# Patient Record
Sex: FEMALE | Race: BLACK OR AFRICAN AMERICAN | Employment: FULL TIME | URBAN - METROPOLITAN AREA
[De-identification: names, ages, dates, MRNs, and addresses within clinical notes are randomized per-mention and may not be internally consistent; named-entity substitution may affect disease eponyms.]

---

## 2017-01-07 ENCOUNTER — ED HISTORICAL/CONVERTED ENCOUNTER (OUTPATIENT)
Dept: OTHER | Age: 27
End: 2017-01-07

## 2017-03-13 ENCOUNTER — ED HISTORICAL/CONVERTED ENCOUNTER (OUTPATIENT)
Dept: OTHER | Age: 27
End: 2017-03-13

## 2017-04-21 ENCOUNTER — ED HISTORICAL/CONVERTED ENCOUNTER (OUTPATIENT)
Dept: OTHER | Age: 27
End: 2017-04-21

## 2017-05-05 ENCOUNTER — ED HISTORICAL/CONVERTED ENCOUNTER (OUTPATIENT)
Dept: OTHER | Age: 27
End: 2017-05-05

## 2017-11-03 ENCOUNTER — ED HISTORICAL/CONVERTED ENCOUNTER (OUTPATIENT)
Dept: OTHER | Age: 27
End: 2017-11-03

## 2018-07-23 ENCOUNTER — ED HISTORICAL/CONVERTED ENCOUNTER (OUTPATIENT)
Dept: OTHER | Age: 28
End: 2018-07-23

## 2019-07-28 ENCOUNTER — ED HISTORICAL/CONVERTED ENCOUNTER (OUTPATIENT)
Dept: OTHER | Age: 29
End: 2019-07-28

## 2022-02-28 ENCOUNTER — HOSPITAL ENCOUNTER (EMERGENCY)
Age: 32
Discharge: HOME | End: 2022-02-28
Attending: EMERGENCY MEDICINE
Payer: COMMERCIAL

## 2022-02-28 VITALS
DIASTOLIC BLOOD PRESSURE: 77 MMHG | RESPIRATION RATE: 22 BRPM | BODY MASS INDEX: 36.7 KG/M2 | SYSTOLIC BLOOD PRESSURE: 141 MMHG | HEIGHT: 64 IN | TEMPERATURE: 98.1 F | HEART RATE: 89 BPM | WEIGHT: 215 LBS | OXYGEN SATURATION: 100 %

## 2022-02-28 DIAGNOSIS — N93.9 VAGINAL BLEEDING: Primary | ICD-10-CM

## 2022-02-28 LAB
ABO + RH BLD: NORMAL
ALBUMIN SERPL-MCNC: 3.6 G/DL (ref 3.5–5)
ALBUMIN/GLOB SERPL: 0.7 {RATIO} (ref 1.1–2.2)
ALP SERPL-CCNC: 142 U/L (ref 45–117)
ALT SERPL-CCNC: 21 U/L (ref 12–78)
ANION GAP SERPL CALC-SCNC: 12 MMOL/L (ref 5–15)
AST SERPL W P-5'-P-CCNC: 12 U/L (ref 15–37)
BASOPHILS # BLD: 0 K/UL (ref 0–0.1)
BASOPHILS NFR BLD: 0 % (ref 0–1)
BILIRUB SERPL-MCNC: 0.4 MG/DL (ref 0.2–1)
BLOOD GROUP ANTIBODIES SERPL: NEGATIVE
BUN SERPL-MCNC: 10 MG/DL (ref 6–20)
BUN/CREAT SERPL: 8 (ref 12–20)
CA-I BLD-MCNC: 9.8 MG/DL (ref 8.5–10.1)
CHLORIDE SERPL-SCNC: 99 MMOL/L (ref 97–108)
CO2 SERPL-SCNC: 25 MMOL/L (ref 21–32)
CREAT SERPL-MCNC: 1.24 MG/DL (ref 0.55–1.02)
DIFFERENTIAL METHOD BLD: ABNORMAL
EOSINOPHIL # BLD: 0.1 K/UL (ref 0–0.4)
EOSINOPHIL NFR BLD: 1 % (ref 0–7)
ERYTHROCYTE [DISTWIDTH] IN BLOOD BY AUTOMATED COUNT: 15 % (ref 11.5–14.5)
GLOBULIN SER CALC-MCNC: 5 G/DL (ref 2–4)
GLUCOSE SERPL-MCNC: 414 MG/DL (ref 65–100)
HCG SERPL-ACNC: 4.5 MIU/ML (ref 0–6)
HCT VFR BLD AUTO: 43.6 % (ref 35–47)
HGB BLD-MCNC: 13.9 G/DL (ref 11.5–16)
IMM GRANULOCYTES # BLD AUTO: 0 K/UL (ref 0–0.04)
IMM GRANULOCYTES NFR BLD AUTO: 0 % (ref 0–0.5)
LYMPHOCYTES # BLD: 2.5 K/UL (ref 0.8–3.5)
LYMPHOCYTES NFR BLD: 34 % (ref 12–49)
MCH RBC QN AUTO: 24.8 PG (ref 26–34)
MCHC RBC AUTO-ENTMCNC: 31.9 G/DL (ref 30–36.5)
MCV RBC AUTO: 77.7 FL (ref 80–99)
MONOCYTES # BLD: 0.4 K/UL (ref 0–1)
MONOCYTES NFR BLD: 5 % (ref 5–13)
NEUTS SEG # BLD: 4.3 K/UL (ref 1.8–8)
NEUTS SEG NFR BLD: 60 % (ref 32–75)
PLATELET # BLD AUTO: 118 K/UL (ref 150–400)
POTASSIUM SERPL-SCNC: 4.3 MMOL/L (ref 3.5–5.1)
PROT SERPL-MCNC: 8.6 G/DL (ref 6.4–8.2)
RBC # BLD AUTO: 5.61 M/UL (ref 3.8–5.2)
SODIUM SERPL-SCNC: 136 MMOL/L (ref 136–145)
SPECIMEN EXP DATE BLD: NORMAL
WBC # BLD AUTO: 7.3 K/UL (ref 3.6–11)

## 2022-02-28 PROCEDURE — 99284 EMERGENCY DEPT VISIT MOD MDM: CPT

## 2022-02-28 PROCEDURE — 80053 COMPREHEN METABOLIC PANEL: CPT

## 2022-02-28 PROCEDURE — 74011250636 HC RX REV CODE- 250/636: Performed by: EMERGENCY MEDICINE

## 2022-02-28 PROCEDURE — 86900 BLOOD TYPING SEROLOGIC ABO: CPT

## 2022-02-28 PROCEDURE — 84702 CHORIONIC GONADOTROPIN TEST: CPT

## 2022-02-28 PROCEDURE — 96360 HYDRATION IV INFUSION INIT: CPT

## 2022-02-28 PROCEDURE — 85025 COMPLETE CBC W/AUTO DIFF WBC: CPT

## 2022-02-28 PROCEDURE — 74011250637 HC RX REV CODE- 250/637: Performed by: EMERGENCY MEDICINE

## 2022-02-28 RX ORDER — ACETAMINOPHEN 500 MG
1000 TABLET ORAL
Status: COMPLETED | OUTPATIENT
Start: 2022-02-28 | End: 2022-02-28

## 2022-02-28 RX ADMIN — SODIUM CHLORIDE 1000 ML: 9 INJECTION, SOLUTION INTRAVENOUS at 17:06

## 2022-02-28 RX ADMIN — ACETAMINOPHEN 1000 MG: 500 TABLET ORAL at 17:19

## 2022-02-28 NOTE — DISCHARGE INSTRUCTIONS
Thank you! Thank you for allowing me to care for you in the emergency department. I sincerely hope that you are satisfied with your visit today. It is my goal to provide you with excellent care. Below you will find a list of your labs and imaging from your visit today. Should you have any questions regarding these results please do not hesitate to call the emergency department. Labs -     Recent Results (from the past 12 hour(s))   BETA HCG, QT    Collection Time: 02/28/22  5:00 PM   Result Value Ref Range    Beta HCG, QT 4.5 0 - 6 mIU/mL   CBC WITH AUTOMATED DIFF    Collection Time: 02/28/22  5:00 PM   Result Value Ref Range    WBC 7.3 3.6 - 11.0 K/uL    RBC 5.61 (H) 3.80 - 5.20 M/uL    HGB 13.9 11.5 - 16.0 g/dL    HCT 43.6 35.0 - 47.0 %    MCV 77.7 (L) 80.0 - 99.0 FL    MCH 24.8 (L) 26.0 - 34.0 PG    MCHC 31.9 30.0 - 36.5 g/dL    RDW 15.0 (H) 11.5 - 14.5 %    PLATELET 655 (L) 869 - 400 K/uL    NEUTROPHILS 60 32 - 75 %    LYMPHOCYTES 34 12 - 49 %    MONOCYTES 5 5 - 13 %    EOSINOPHILS 1 0 - 7 %    BASOPHILS 0 0 - 1 %    IMMATURE GRANULOCYTES 0 0.0 - 0.5 %    ABS. NEUTROPHILS 4.3 1.8 - 8.0 K/UL    ABS. LYMPHOCYTES 2.5 0.8 - 3.5 K/UL    ABS. MONOCYTES 0.4 0.0 - 1.0 K/UL    ABS. EOSINOPHILS 0.1 0.0 - 0.4 K/UL    ABS. BASOPHILS 0.0 0.0 - 0.1 K/UL    ABS. IMM. GRANS. 0.0 0.00 - 0.04 K/UL    DF AUTOMATED     METABOLIC PANEL, COMPREHENSIVE    Collection Time: 02/28/22  5:00 PM   Result Value Ref Range    Sodium 136 136 - 145 mmol/L    Potassium 4.3 3.5 - 5.1 mmol/L    Chloride 99 97 - 108 mmol/L    CO2 25 21 - 32 mmol/L    Anion gap 12 5 - 15 mmol/L    Glucose 414 (H) 65 - 100 mg/dL    BUN 10 6 - 20 mg/dL    Creatinine 1.24 (H) 0.55 - 1.02 mg/dL    BUN/Creatinine ratio 8 (L) 12 - 20      GFR est AA >60 >60 ml/min/1.73m2    GFR est non-AA 51 (L) >60 ml/min/1.73m2    Calcium 9.8 8.5 - 10.1 mg/dL    Bilirubin, total 0.4 0.2 - 1.0 mg/dL    AST (SGOT) 12 (L) 15 - 37 U/L    ALT (SGPT) 21 12 - 78 U/L    Alk. phosphatase 142 (H) 45 - 117 U/L    Protein, total 8.6 (H) 6.4 - 8.2 g/dL    Albumin 3.6 3.5 - 5.0 g/dL    Globulin 5.0 (H) 2.0 - 4.0 g/dL    A-G Ratio 0.7 (L) 1.1 - 2.2         Radiologic Studies -   No orders to display     CT Results  (Last 48 hours)      None          CXR Results  (Last 48 hours)      None               If you feel that you have not received excellent quality care or timely care, please ask to speak to the nurse manager. Please choose us in the future for your continued health care needs. ------------------------------------------------------------------------------------------------------------  The exam and treatment you received in the Emergency Department were for an urgent problem and are not intended as complete care. It is important that you follow-up with a doctor, nurse practitioner, or physician assistant to:  (1) confirm your diagnosis,  (2) re-evaluation of changes in your illness and treatment, and  (3) for ongoing care. If your symptoms become worse or you do not improve as expected and you are unable to reach your usual health care provider, you should return to the Emergency Department. We are available 24 hours a day. Please take your discharge instructions with you when you go to your follow-up appointment. If you have any problem arranging a follow-up appointment, contact the Emergency Department immediately. If a prescription has been provided, please have it filled as soon as possible to prevent a delay in treatment. Read the entire medication instruction sheet provided to you by the pharmacy. If you have any questions or reservations about taking the medication due to side effects or interactions with other medications, please call your primary care physician or contact the ER to speak with the charge nurse.      Make an appointment with your family doctor or the physician you were referred to for follow-up of this visit as instructed on your discharge paperwork, as this is a mandatory follow-up. Return to the ER if you are unable to be seen or if you are unable to be seen in a timely manner. If you have any problem arranging the follow-up visit, contact the Emergency Department immediately.

## 2022-02-28 NOTE — ED TRIAGE NOTES
Pt c/o vaginal bleeding and abdominal cramping. Cramping began three days ago. Bleeding began yesterday. LMP in December, no confirmed pregnancy.   Hx of miscarriage in past.

## 2022-03-02 NOTE — ED PROVIDER NOTES
EMERGENCY DEPARTMENT HISTORY AND PHYSICAL EXAM      Date: 2/28/2022  Patient Name: Maulik Reyes    History of Presenting Illness     Chief Complaint   Patient presents with    Vaginal Bleeding    Abdominal Pain       History Provided By: Patient    HPI: Maulik Reyes, 29 y.o. female with a past medical history significant for previous miscarraige presents to the ED with cc of heavy vaginal bleeding. Patient states that she started bleeding yesterday. Last period was in December, however patient states that she does have irregular periods. Patient reports that bleeding has slowed, however she is having severe cramps. Patient states that she is bleeding heavier than her usual periods. She is unsure if she is pregnant. Patient reports no dizziness, lightheadedness, nausea, vomiting. There are no other complaints, changes, or physical findings at this time. PCP: None    No current facility-administered medications on file prior to encounter. No current outpatient medications on file prior to encounter. Past History     Past Medical History:  No past medical history on file. Past Surgical History:  No past surgical history on file. Family History:  No family history on file. Social History:  Social History     Tobacco Use    Smoking status: Not on file    Smokeless tobacco: Not on file   Substance Use Topics    Alcohol use: Not on file    Drug use: Not on file       Allergies:  No Known Allergies      Review of Systems     Review of Systems   Constitutional: Negative for chills and fever. HENT: Negative for congestion and sore throat. Eyes: Negative for pain and visual disturbance. Respiratory: Negative for cough and shortness of breath. Cardiovascular: Negative for chest pain and palpitations. Gastrointestinal: Negative for constipation, diarrhea, nausea and vomiting. Genitourinary: Positive for menstrual problem, pelvic pain and vaginal bleeding.  Negative for dysuria and frequency. Musculoskeletal: Negative for arthralgias and myalgias. Skin: Negative for color change and rash. Neurological: Negative for dizziness, weakness, light-headedness and headaches. Psychiatric/Behavioral: Negative for dysphoric mood and sleep disturbance. Physical Exam     Physical Exam  Constitutional:       Appearance: Normal appearance. HENT:      Head: Normocephalic and atraumatic. Right Ear: External ear normal.      Left Ear: External ear normal.      Nose: Nose normal.      Mouth/Throat:      Mouth: Mucous membranes are moist.   Eyes:      Extraocular Movements: Extraocular movements intact. Conjunctiva/sclera: Conjunctivae normal.   Cardiovascular:      Rate and Rhythm: Normal rate and regular rhythm. Pulses: Normal pulses. Heart sounds: Normal heart sounds. Pulmonary:      Effort: Pulmonary effort is normal.      Breath sounds: Normal breath sounds. Abdominal:      General: Abdomen is flat. There is no distension. Palpations: Abdomen is soft. Tenderness: There is abdominal tenderness in the suprapubic area. Musculoskeletal:         General: Normal range of motion. Cervical back: Normal range of motion. Skin:     General: Skin is warm and dry. Capillary Refill: Capillary refill takes less than 2 seconds. Neurological:      General: No focal deficit present. Mental Status: She is alert and oriented to person, place, and time. Mental status is at baseline. Psychiatric:         Mood and Affect: Mood normal.         Behavior: Behavior normal.         Lab and Diagnostic Study Results     Labs -   No results found for this or any previous visit (from the past 12 hour(s)). Radiologic Studies -   @lastxrresult@  CT Results  (Last 48 hours)    None        CXR Results  (Last 48 hours)    None            Medical Decision Making   - I am the first provider for this patient.     - I reviewed the vital signs, available nursing notes, past medical history, past surgical history, family history and social history. - Initial assessment performed. The patients presenting problems have been discussed, and they are in agreement with the care plan formulated and outlined with them. I have encouraged them to ask questions as they arise throughout their visit. Vital Signs-Reviewed the patient's vital signs. No data found. Records Reviewed: Nursing Notes    The patient presents with vaginal bleeding with a differential diagnosis of dysmenorrhea, metrorrhagia, menometrorrhagia, miscarriage, ruptured ectopic      ED Course:          Provider Notes (Medical Decision Making):   30-year-old female with past medical history significant for previous miscarriage presents to the ED for evaluation of heavy vaginal bleeding and cramping. Patient's last cycle was December 2021. Unknown if she is currently pregnant. On physical examination, patient is initially tachycardic. She has mild suprapubic abdominal tenderness to palpation. She is mildly anxious. Blood pressure without evidence of hypotension. There is no conjunctival pallor. CBC, CMP, quantitative hCG demonstrating hemoglobin of 13.9 and hCG of 4.5. Given hemoglobin of 13.9, did not feel that patient was lost a significant amount of blood. She states that bleeding has slowed while in the ED and that her biggest complaint is painful cramping. Beta hCG of 4.5 not consistent with pregnancy. Patient given 1 L normal saline IV bolus and 1 g of p.o. Tylenol with subjective improvement of her symptoms as well as tachycardia. She is instructed to follow-up with her OB/GYN and return to the ED if she develops new or worsening symptoms. MDM       Procedures   Medical Decision Makingedical Decision Making  Performed by: Jorje Espinal DO  PROCEDURES:  Procedures       Disposition   Disposition: Condition stable and improved  DC- Adult Discharges:  All of the diagnostic tests were reviewed and questions answered. Diagnosis, care plan and treatment options were discussed. The patient understands the instructions and will follow up as directed. The patients results have been reviewed with them. They have been counseled regarding their diagnosis. The patient verbally convey understanding and agreement of the signs, symptoms, diagnosis, treatment and prognosis and additionally agrees to follow up as recommended with their PCP in 24 - 48 hours. They also agree with the care-plan and convey that all of their questions have been answered. I have also put together some discharge instructions for them that include: 1) educational information regarding their diagnosis, 2) how to care for their diagnosis at home, as well a 3) list of reasons why they would want to return to the ED prior to their follow-up appointment, should their condition change. DC-The patient was given verbal vaginal bleeding instructions    Discharged    DISCHARGE PLAN:  1. Cannot display discharge medications since this patient is not currently admitted. 2.   Follow-up Information     Follow up With Specialties Details Why Contact Info    [address] [place] [address] Emergency Medicine  As needed, If symptoms worsen [address] [place] [id number] [phone]    Your OB  Schedule an appointment as soon as possible for a visit           3. Return to ED if worse   4. There are no discharge medications for this patient. Diagnosis     Clinical Impression:   1. Vaginal bleeding        Attestations:    Tl Lagunas, DO    Please note that this dictation was completed with [employer], the computer voice recognition software. Quite often unanticipated grammatical, syntax, homophones, and other interpretive errors are inadvertently transcribed by the computer software. Please disregard these errors. Please excuse any errors that have escaped final proofreading. Thank you.

## 2022-11-24 ENCOUNTER — HOSPITAL ENCOUNTER (EMERGENCY)
Age: 32
Discharge: HOME | End: 2022-11-24
Attending: EMERGENCY MEDICINE
Payer: COMMERCIAL

## 2022-11-24 ENCOUNTER — APPOINTMENT (OUTPATIENT)
Dept: GENERAL RADIOLOGY | Age: 32
End: 2022-11-24
Attending: EMERGENCY MEDICINE
Payer: COMMERCIAL

## 2022-11-24 VITALS
DIASTOLIC BLOOD PRESSURE: 89 MMHG | WEIGHT: 275 LBS | OXYGEN SATURATION: 99 % | SYSTOLIC BLOOD PRESSURE: 127 MMHG | BODY MASS INDEX: 46.95 KG/M2 | RESPIRATION RATE: 16 BRPM | HEIGHT: 64 IN | HEART RATE: 91 BPM | TEMPERATURE: 98.1 F

## 2022-11-24 DIAGNOSIS — E11.9 DIABETES MELLITUS, NEW ONSET (HCC): Primary | ICD-10-CM

## 2022-11-24 LAB
ALBUMIN SERPL-MCNC: 3.5 G/DL (ref 3.5–5)
ALBUMIN/GLOB SERPL: 0.7 {RATIO} (ref 1.1–2.2)
ALP SERPL-CCNC: 122 U/L (ref 45–117)
ALT SERPL-CCNC: 39 U/L (ref 12–78)
ANION GAP SERPL CALC-SCNC: 5 MMOL/L (ref 5–15)
AST SERPL W P-5'-P-CCNC: 30 U/L (ref 15–37)
B-OH-BUTYR SERPL-SCNC: 0.65 MMOL/L
BASE EXCESS BLDV CALC-SCNC: 0.3 MMOL/L (ref 0–3)
BASOPHILS # BLD: 0 K/UL (ref 0–0.1)
BASOPHILS NFR BLD: 0 % (ref 0–1)
BDY SITE: ABNORMAL
BILIRUB SERPL-MCNC: 0.5 MG/DL (ref 0.2–1)
BNP SERPL-MCNC: 20 PG/ML
BODY TEMPERATURE: 98.3
BUN SERPL-MCNC: 9 MG/DL (ref 6–20)
BUN/CREAT SERPL: 9 (ref 12–20)
CA-I BLD-MCNC: 9.6 MG/DL (ref 8.5–10.1)
CHLORIDE SERPL-SCNC: 99 MMOL/L (ref 97–108)
CO2 SERPL-SCNC: 27 MMOL/L (ref 21–32)
CREAT SERPL-MCNC: 1 MG/DL (ref 0.55–1.02)
DIFFERENTIAL METHOD BLD: ABNORMAL
EOSINOPHIL # BLD: 0 K/UL (ref 0–0.4)
EOSINOPHIL NFR BLD: 1 % (ref 0–7)
ERYTHROCYTE [DISTWIDTH] IN BLOOD BY AUTOMATED COUNT: 15.2 % (ref 11.5–14.5)
GLOBULIN SER CALC-MCNC: 4.9 G/DL (ref 2–4)
GLUCOSE BLD STRIP.AUTO-MCNC: 297 MG/DL (ref 65–100)
GLUCOSE SERPL-MCNC: 340 MG/DL (ref 65–100)
HCG SERPL QL: NEGATIVE
HCO3 BLDV-SCNC: 24 MMOL/L (ref 24–25)
HCT VFR BLD AUTO: 40.7 % (ref 35–47)
HGB BLD-MCNC: 12.6 G/DL (ref 11.5–16)
IMM GRANULOCYTES # BLD AUTO: 0 K/UL (ref 0–0.04)
IMM GRANULOCYTES NFR BLD AUTO: 0 % (ref 0–0.5)
LYMPHOCYTES # BLD: 2 K/UL (ref 0.8–3.5)
LYMPHOCYTES NFR BLD: 39 % (ref 12–49)
MAGNESIUM SERPL-MCNC: 1.9 MG/DL (ref 1.6–2.4)
MCH RBC QN AUTO: 23.6 PG (ref 26–34)
MCHC RBC AUTO-ENTMCNC: 31 G/DL (ref 30–36.5)
MCV RBC AUTO: 76.1 FL (ref 80–99)
MONOCYTES # BLD: 0.3 K/UL (ref 0–1)
MONOCYTES NFR BLD: 7 % (ref 5–13)
NEUTS SEG # BLD: 2.7 K/UL (ref 1.8–8)
NEUTS SEG NFR BLD: 53 % (ref 32–75)
NRBC # BLD: 0 K/UL (ref 0–0.01)
NRBC BLD-RTO: 0 PER 100 WBC
PCO2 BLDV: 34.3 MMHG (ref 41–51)
PERFORMED BY, TECHID: ABNORMAL
PERFORMED BY, TECHID: ABNORMAL
PH BLDV: 7.46 [PH] (ref 7.32–7.42)
PLATELET # BLD AUTO: 132 K/UL (ref 150–400)
PO2 BLDV: 141 MMHG (ref 25–40)
POTASSIUM SERPL-SCNC: 5 MMOL/L (ref 3.5–5.1)
PROT SERPL-MCNC: 8.4 G/DL (ref 6.4–8.2)
RBC # BLD AUTO: 5.35 M/UL (ref 3.8–5.2)
SAO2% DEVICE SAO2% SENSOR NAME: ABNORMAL
SODIUM SERPL-SCNC: 131 MMOL/L (ref 136–145)
SPECIMEN SITE: ABNORMAL
TROPONIN-HIGH SENSITIVITY: 9 NG/L (ref 0–51)
TSH SERPL DL<=0.05 MIU/L-ACNC: 2.19 UIU/ML (ref 0.36–3.74)
WBC # BLD AUTO: 5.1 K/UL (ref 3.6–11)

## 2022-11-24 PROCEDURE — 85025 COMPLETE CBC W/AUTO DIFF WBC: CPT

## 2022-11-24 PROCEDURE — 82962 GLUCOSE BLOOD TEST: CPT

## 2022-11-24 PROCEDURE — 82803 BLOOD GASES ANY COMBINATION: CPT

## 2022-11-24 PROCEDURE — 74011250636 HC RX REV CODE- 250/636: Performed by: EMERGENCY MEDICINE

## 2022-11-24 PROCEDURE — 96374 THER/PROPH/DIAG INJ IV PUSH: CPT

## 2022-11-24 PROCEDURE — 84443 ASSAY THYROID STIM HORMONE: CPT

## 2022-11-24 PROCEDURE — 84484 ASSAY OF TROPONIN QUANT: CPT

## 2022-11-24 PROCEDURE — 82010 KETONE BODYS QUAN: CPT

## 2022-11-24 PROCEDURE — 74011000250 HC RX REV CODE- 250: Performed by: EMERGENCY MEDICINE

## 2022-11-24 PROCEDURE — 96361 HYDRATE IV INFUSION ADD-ON: CPT

## 2022-11-24 PROCEDURE — 84703 CHORIONIC GONADOTROPIN ASSAY: CPT

## 2022-11-24 PROCEDURE — 71045 X-RAY EXAM CHEST 1 VIEW: CPT

## 2022-11-24 PROCEDURE — 83880 ASSAY OF NATRIURETIC PEPTIDE: CPT

## 2022-11-24 PROCEDURE — 99285 EMERGENCY DEPT VISIT HI MDM: CPT

## 2022-11-24 PROCEDURE — 36415 COLL VENOUS BLD VENIPUNCTURE: CPT

## 2022-11-24 PROCEDURE — 80053 COMPREHEN METABOLIC PANEL: CPT

## 2022-11-24 PROCEDURE — 83735 ASSAY OF MAGNESIUM: CPT

## 2022-11-24 PROCEDURE — 93005 ELECTROCARDIOGRAM TRACING: CPT

## 2022-11-24 RX ORDER — METFORMIN HYDROCHLORIDE 500 MG/1
500 TABLET ORAL 2 TIMES DAILY WITH MEALS
Qty: 60 TABLET | Refills: 0 | Status: SHIPPED | OUTPATIENT
Start: 2022-11-24

## 2022-11-24 RX ORDER — METOPROLOL TARTRATE 5 MG/5ML
5 INJECTION INTRAVENOUS
Status: COMPLETED | OUTPATIENT
Start: 2022-11-24 | End: 2022-11-24

## 2022-11-24 RX ADMIN — SODIUM CHLORIDE 1000 ML: 9 INJECTION, SOLUTION INTRAVENOUS at 07:29

## 2022-11-24 RX ADMIN — SODIUM CHLORIDE 1000 ML: 9 INJECTION, SOLUTION INTRAVENOUS at 09:12

## 2022-11-24 RX ADMIN — METOPROLOL TARTRATE 5 MG: 5 INJECTION, SOLUTION INTRAVENOUS at 10:51

## 2022-11-24 NOTE — Clinical Note
[facility] EMERGENCY DEPT  [address] [place] [id number]  [phone]    Work/School Note    Date: 11/24/2022    To Whom It May concern:    Cristina Negron was seen and treated today in the emergency room by the following provider(s):  Attending Provider: Rob Javed MD.      Cristina Negron is excused from work/school on 11/24/22 and 11/25/22. She is medically clear to return to work/school on 11/26/2022.        Sincerely,          Hanna Long MD

## 2022-11-24 NOTE — Clinical Note
[facility] EMERGENCY DEPT  [address] [place] [id number]  [phone]    Work/School Note    Date: 11/24/2022    To Whom It May concern:      Clifford Toledo was seen and treated today in the emergency room by the following provider(s):  Attending Provider: Francisco Javier Ulrich MD.      Clifford Toledo is excused from work/school on 11/24/22. She is clear to return to work/school on 11/25/22.         Sincerely,          Joaquina Joe MD

## 2022-11-24 NOTE — DISCHARGE INSTRUCTIONS
Thank you! Thank you for allowing me to care for you in the emergency department. It is my goal to provide you with excellent care. If you have not received excellent quality care, please ask to speak to the nurse manager. Please fill out the survey that will come to you by mail or email since we listen to your feedback! Below you will find a list of your tests from today's visit. Should you have any questions, please do not hesitate to call the emergency department. Labs  Recent Results (from the past 12 hour(s))   CBC WITH AUTOMATED DIFF    Collection Time: 11/24/22  7:28 AM   Result Value Ref Range    WBC 5.1 3.6 - 11.0 K/uL    RBC 5.35 (H) 3.80 - 5.20 M/uL    HGB 12.6 11.5 - 16.0 g/dL    HCT 40.7 35.0 - 47.0 %    MCV 76.1 (L) 80.0 - 99.0 FL    MCH 23.6 (L) 26.0 - 34.0 PG    MCHC 31.0 30.0 - 36.5 g/dL    RDW 15.2 (H) 11.5 - 14.5 %    PLATELET 742 (L) 112 - 400 K/uL    NRBC 0.0 0.0  WBC    ABSOLUTE NRBC 0.00 0.00 - 0.01 K/uL    NEUTROPHILS 53 32 - 75 %    LYMPHOCYTES 39 12 - 49 %    MONOCYTES 7 5 - 13 %    EOSINOPHILS 1 0 - 7 %    BASOPHILS 0 0 - 1 %    IMMATURE GRANULOCYTES 0 0 - 0.5 %    ABS. NEUTROPHILS 2.7 1.8 - 8.0 K/UL    ABS. LYMPHOCYTES 2.0 0.8 - 3.5 K/UL    ABS. MONOCYTES 0.3 0.0 - 1.0 K/UL    ABS. EOSINOPHILS 0.0 0.0 - 0.4 K/UL    ABS. BASOPHILS 0.0 0.0 - 0.1 K/UL    ABS. IMM. GRANS. 0.0 0.00 - 0.04 K/UL    DF AUTOMATED     METABOLIC PANEL, COMPREHENSIVE    Collection Time: 11/24/22  7:28 AM   Result Value Ref Range    Sodium 131 (L) 136 - 145 mmol/L    Potassium 5.0 3.5 - 5.1 mmol/L    Chloride 99 97 - 108 mmol/L    CO2 27 21 - 32 mmol/L    Anion gap 5 5 - 15 mmol/L    Glucose 340 (H) 65 - 100 mg/dL    BUN 9 6 - 20 mg/dL    Creatinine 1.00 0.55 - 1.02 mg/dL    BUN/Creatinine ratio 9 (L) 12 - 20      eGFR >60 >60 ml/min/1.73m2    Calcium 9.6 8.5 - 10.1 mg/dL    Bilirubin, total 0.5 0.2 - 1.0 mg/dL    AST (SGOT) 30 15 - 37 U/L    ALT (SGPT) 39 12 - 78 U/L    Alk.  phosphatase 122 (H) 45 - 117 U/L    Protein, total 8.4 (H) 6.4 - 8.2 g/dL    Albumin 3.5 3.5 - 5.0 g/dL    Globulin 4.9 (H) 2.0 - 4.0 g/dL    A-G Ratio 0.7 (L) 1.1 - 2.2     HCG QL SERUM    Collection Time: 11/24/22  7:28 AM   Result Value Ref Range    HCG, Ql. Negative Negative     TSH 3RD GENERATION    Collection Time: 11/24/22  7:28 AM   Result Value Ref Range    TSH 2.19 0.36 - 3.74 uIU/mL   NT-PRO BNP    Collection Time: 11/24/22  7:28 AM   Result Value Ref Range    NT pro-BNP 20 <125 pg/mL   TROPONIN-HIGH SENSITIVITY    Collection Time: 11/24/22  7:28 AM   Result Value Ref Range    Troponin-High Sensitivity 9 0 - 51 ng/L   MAGNESIUM    Collection Time: 11/24/22  7:28 AM   Result Value Ref Range    Magnesium 1.9 1.6 - 2.4 mg/dL   BLOOD GAS, VENOUS    Collection Time: 11/24/22  8:04 AM   Result Value Ref Range    VENOUS PH 7.457 (H) 7.32 - 7.42      VENOUS PCO2 34.3 (L) 41 - 51 mmHg    VENOUS PO2 141 (H) 25 - 40 mmHg    VENOUS BICARBONATE 24 24 - 25 mmol/L    VENOUS BASE EXCESS 0.3 0 - 3 mmol/L    O2 METHOD Room air      Sample source Venous      SITE OTHER      Performed by Ryan Kapadia     TEMPERATURE 98.3     GLUCOSE, POC    Collection Time: 11/24/22 10:50 AM   Result Value Ref Range    Glucose (POC) 297 (H) 65 - 100 mg/dL    Performed by Jigar Kulkarni        Radiologic Studies  XR CHEST PORT   Final Result   Clear lungs. CT Results  (Last 48 hours)      None          CXR Results  (Last 48 hours)                 11/24/22 0915  XR CHEST PORT Final result    Impression:  Clear lungs. Narrative:  PORTABLE CHEST RADIOGRAPH/S: 11/24/2022 9:15 AM       INDICATION: Palpitations. COMPARISON: 7/23/2018, 5/5/2017, 3/13/2017. TECHNIQUE: Portable frontal upright radiograph/s of the chest.       FINDINGS:    The radiograph is slightly underpenetrated secondary to patient body habitus and   portable technique. The lungs are clear. The central airways are patent. No   pneumothorax or pleural effusion. ------------------------------------------------------------------------------------------------------------  The exam and treatment you received in the Emergency Department were for an urgent problem and are not intended as complete care. It is important that you follow-up with a doctor, nurse practitioner, or physician assistant to:  (1) confirm your diagnosis,  (2) re-evaluation of changes in your illness and treatment, and  (3) for ongoing care. Please take your discharge instructions with you when you go to your follow-up appointment. If you have any problem arranging a follow-up appointment, contact the Emergency Department. If your symptoms become worse or you do not improve as expected and you are unable to reach your health care provider, please return to the Emergency Department. We are available 24 hours a day. If a prescription has been provided, please have it filled as soon as possible to prevent a delay in treatment. If you have any questions or reservations about taking the medication due to side effects or interactions with other medications, please call your primary care provider or contact the ER.

## 2022-11-24 NOTE — ED PROVIDER NOTES
EMERGENCY DEPARTMENT HISTORY AND PHYSICAL EXAM      Date: 11/24/2022  Patient Name: Andreia Regalado    History of Presenting Illness     Chief Complaint   Patient presents with    Palpitations       History Provided By: Patient and EMS    HPI: Andreia Regalado, 29 y.o. female with a past medical history significant No significant past medical history presents to the ED with chief complaint of Palpitations  . 46-year-old female with a history of anxiety. No history of diabetes. Presents via EMS complaining of palpitations at home. Glucose was found to be 431. Family history of diabetes. She could feel her heart beating. Not irregularly. Not fast.  No pain. No shortness of breath. It started after she felt lightheaded and dizzy. Had an episode of nausea no vomiting. The symptoms have resolved upon arrival.  She says it might of been a panic attack. Does not take medicine on a regular for that. Presently feels much better. There are no other complaints, changes, or physical findings at this time. PCP: None    Current Facility-Administered Medications   Medication Dose Route Frequency Provider Last Rate Last Admin    sodium chloride 0.9 % bolus infusion 1,000 mL  1,000 mL IntraVENous ONCE Christiane Jeter MD         Current Outpatient Medications   Medication Sig Dispense Refill    metFORMIN (GLUCOPHAGE) 500 mg tablet Take 1 Tablet by mouth two (2) times daily (with meals). 60 Tablet 0       Past History     Past Medical History:  Past Medical History:   Diagnosis Date    Anxiety        Past Surgical History: denies    Family History: DM    Social History: denies       Allergies:  No Known Allergies      Review of Systems   Review of Systems   Constitutional: Negative. Negative for chills, fatigue and fever. HENT: Negative. Negative for congestion, nosebleeds and sore throat. Eyes: Negative. Negative for pain, discharge and visual disturbance. Respiratory: Negative. Negative for cough, chest tightness and shortness of breath. Cardiovascular:  Positive for palpitations. Negative for chest pain and leg swelling. Gastrointestinal:  Positive for nausea. Negative for abdominal pain, blood in stool, constipation, diarrhea and vomiting. Endocrine: Negative. Genitourinary: Negative. Negative for difficulty urinating, dysuria, pelvic pain and vaginal bleeding. Musculoskeletal: Negative. Negative for arthralgias, back pain and myalgias. Skin: Negative. Negative for rash and wound. Allergic/Immunologic: Negative. Neurological: Negative. Negative for dizziness, syncope, weakness, numbness and headaches. Hematological: Negative. Psychiatric/Behavioral: Negative. Negative for agitation, confusion and suicidal ideas. All other systems reviewed and are negative. Physical Exam   Physical Exam  Vitals and nursing note reviewed. Exam conducted with a chaperone present. Constitutional:       Appearance: Normal appearance. She is normal weight. HENT:      Head: Normocephalic and atraumatic. Nose: Nose normal.      Mouth/Throat:      Mouth: Mucous membranes are moist.      Pharynx: Oropharynx is clear. Eyes:      Extraocular Movements: Extraocular movements intact. Conjunctiva/sclera: Conjunctivae normal.      Pupils: Pupils are equal, round, and reactive to light. Cardiovascular:      Rate and Rhythm: Normal rate and regular rhythm. Pulses: Normal pulses. Heart sounds: Normal heart sounds. Pulmonary:      Effort: Pulmonary effort is normal. No respiratory distress. Breath sounds: Normal breath sounds. Abdominal:      General: Abdomen is flat. Bowel sounds are normal. There is no distension. Palpations: Abdomen is soft. Tenderness: There is no abdominal tenderness. There is no guarding. Musculoskeletal:         General: No swelling, tenderness, deformity or signs of injury. Normal range of motion.       Cervical back: Normal range of motion and neck supple. Right lower leg: No edema. Left lower leg: No edema. Skin:     General: Skin is warm and dry. Capillary Refill: Capillary refill takes less than 2 seconds. Findings: No lesion or rash. Neurological:      General: No focal deficit present. Mental Status: She is alert and oriented to person, place, and time. Mental status is at baseline. Cranial Nerves: No cranial nerve deficit. Psychiatric:         Mood and Affect: Mood normal.         Behavior: Behavior normal.         Thought Content: Thought content normal.         Judgment: Judgment normal.       Diagnostic Study Results     Labs -     Recent Results (from the past 12 hour(s))   CBC WITH AUTOMATED DIFF    Collection Time: 11/24/22  7:28 AM   Result Value Ref Range    WBC 5.1 3.6 - 11.0 K/uL    RBC 5.35 (H) 3.80 - 5.20 M/uL    HGB 12.6 11.5 - 16.0 g/dL    HCT 40.7 35.0 - 47.0 %    MCV 76.1 (L) 80.0 - 99.0 FL    MCH 23.6 (L) 26.0 - 34.0 PG    MCHC 31.0 30.0 - 36.5 g/dL    RDW 15.2 (H) 11.5 - 14.5 %    PLATELET 459 (L) 620 - 400 K/uL    NRBC 0.0 0.0  WBC    ABSOLUTE NRBC 0.00 0.00 - 0.01 K/uL    NEUTROPHILS 53 32 - 75 %    LYMPHOCYTES 39 12 - 49 %    MONOCYTES 7 5 - 13 %    EOSINOPHILS 1 0 - 7 %    BASOPHILS 0 0 - 1 %    IMMATURE GRANULOCYTES 0 0 - 0.5 %    ABS. NEUTROPHILS 2.7 1.8 - 8.0 K/UL    ABS. LYMPHOCYTES 2.0 0.8 - 3.5 K/UL    ABS. MONOCYTES 0.3 0.0 - 1.0 K/UL    ABS. EOSINOPHILS 0.0 0.0 - 0.4 K/UL    ABS. BASOPHILS 0.0 0.0 - 0.1 K/UL    ABS. IMM.  GRANS. 0.0 0.00 - 0.04 K/UL    DF AUTOMATED     METABOLIC PANEL, COMPREHENSIVE    Collection Time: 11/24/22  7:28 AM   Result Value Ref Range    Sodium 131 (L) 136 - 145 mmol/L    Potassium 5.0 3.5 - 5.1 mmol/L    Chloride 99 97 - 108 mmol/L    CO2 27 21 - 32 mmol/L    Anion gap 5 5 - 15 mmol/L    Glucose 340 (H) 65 - 100 mg/dL    BUN 9 6 - 20 mg/dL    Creatinine 1.00 0.55 - 1.02 mg/dL    BUN/Creatinine ratio 9 (L) 12 - 20      eGFR >60 >60 ml/min/1.73m2    Calcium 9.6 8.5 - 10.1 mg/dL    Bilirubin, total 0.5 0.2 - 1.0 mg/dL    AST (SGOT) 30 15 - 37 U/L    ALT (SGPT) 39 12 - 78 U/L    Alk. phosphatase 122 (H) 45 - 117 U/L    Protein, total 8.4 (H) 6.4 - 8.2 g/dL    Albumin 3.5 3.5 - 5.0 g/dL    Globulin 4.9 (H) 2.0 - 4.0 g/dL    A-G Ratio 0.7 (L) 1.1 - 2.2     HCG QL SERUM    Collection Time: 11/24/22  7:28 AM   Result Value Ref Range    HCG, Ql. Negative Negative     TSH 3RD GENERATION    Collection Time: 11/24/22  7:28 AM   Result Value Ref Range    TSH 2.19 0.36 - 3.74 uIU/mL   NT-PRO BNP    Collection Time: 11/24/22  7:28 AM   Result Value Ref Range    NT pro-BNP 20 <125 pg/mL   TROPONIN-HIGH SENSITIVITY    Collection Time: 11/24/22  7:28 AM   Result Value Ref Range    Troponin-High Sensitivity 9 0 - 51 ng/L   MAGNESIUM    Collection Time: 11/24/22  7:28 AM   Result Value Ref Range    Magnesium 1.9 1.6 - 2.4 mg/dL   BLOOD GAS, VENOUS    Collection Time: 11/24/22  8:04 AM   Result Value Ref Range    VENOUS PH 7.457 (H) 7.32 - 7.42      VENOUS PCO2 34.3 (L) 41 - 51 mmHg    VENOUS PO2 141 (H) 25 - 40 mmHg    VENOUS BICARBONATE 24 24 - 25 mmol/L    VENOUS BASE EXCESS 0.3 0 - 3 mmol/L    O2 METHOD Room air      Sample source Venous      SITE OTHER      Performed by Kong Haq     TEMPERATURE 98.3     GLUCOSE, POC    Collection Time: 11/24/22 10:50 AM   Result Value Ref Range    Glucose (POC) 297 (H) 65 - 100 mg/dL    Performed by [facility]          Radiologic Studies -   XR CHEST PORT   Final Result   Clear lungs. CT Results  (Last 48 hours)      None          CXR Results  (Last 48 hours)                 11/24/22 0915  XR CHEST PORT Final result    Impression:  Clear lungs. Narrative:  PORTABLE CHEST RADIOGRAPH/S: 11/24/2022 9:15 AM       INDICATION: Palpitations. COMPARISON: 7/23/2018, 5/5/2017, 3/13/2017.        TECHNIQUE: Portable frontal upright radiograph/s of the chest.       FINDINGS:    The radiograph is slightly underpenetrated secondary to patient body habitus and   portable technique. The lungs are clear. The central airways are patent. No   pneumothorax or pleural effusion. Medical Decision Making and ED Course   I am the first provider for this patient. I reviewed the vital signs, available nursing notes, past medical history, past surgical history, family history and social history. Vital Signs-Reviewed the patient's vital signs. Patient Vitals for the past 12 hrs:   Temp Pulse Resp BP SpO2   11/24/22 1211 -- 91 -- 127/89 --   11/24/22 1208 98.1 °F (36.7 °C) 89 16 (!) 137/99 99 %   11/24/22 1051 -- (!) 109 -- (!) 143/109 --   11/24/22 1041 98 °F (36.7 °C) (!) 102 18 (!) 140/102 97 %   11/24/22 0905 98.2 °F (36.8 °C) (!) 128 16 (!) 148/110 99 %   11/24/22 0904 -- (!) 130 15 -- 100 %   11/24/22 0841 -- (!) 112 18 -- 98 %   11/24/22 0744 -- (!) 124 17 (!) 157/115 98 %   11/24/22 0714 -- (!) 125 17 (!) 140/108 99 %   11/24/22 0709 98.3 °F (36.8 °C) (!) 122 20 (!) 146/113 97 %       EKG interpretation:   EKG at 711. Sinus tachycardia rate of 127. No ST changes. No T wave inversions. Normal intervals. Reason rule out dysrhythmia. Interpreted by ER physician. Records Reviewed: Previous Hospital chart. EMS run report      ED Course:   Initial assessment performed. The patients presenting problems have been discussed, and they are in agreement with the care plan formulated and outlined with them. I have encouraged them to ask questions as they arise throughout their visit. Orders Placed This Encounter    XR CHEST PORT     Standing Status:   Standing     Number of Occurrences:   1     Order Specific Question:   Reason for Exam     Answer:   palpitations     Order Specific Question:   Is Patient Pregnant?      Answer:   Unknown    CBC WITH AUTOMATED DIFF     Standing Status:   Standing     Number of Occurrences:   1    COMPREHENSIVE METABOLIC PANEL     Standing Status:   Standing Number of Occurrences:   1    HCG QL SERUM     Standing Status:   Standing     Number of Occurrences:   1    TSH 3RD GENERATION     Standing Status:   Standing     Number of Occurrences:   1    BETA-HYDROXYBUTYRATE     Standing Status:   Standing     Number of Occurrences:   1    VENOUS BLOOD GAS     Standing Status:   Standing     Number of Occurrences:   1    URINALYSIS W/ RFLX MICROSCOPIC     Standing Status:   Standing     Number of Occurrences:   1    PRO-BNP     Standing Status:   Standing     Number of Occurrences:   1    TROPONIN-HIGH SENSITIVITY     Standing Status:   Standing     Number of Occurrences:   1    MAGNESIUM     Standing Status:   Standing     Number of Occurrences:   1    BLOOD GAS, VENOUS     Standing Status:   Standing     Number of Occurrences:   1    VITAL SIGNS     Standing Status:   Standing     Number of Occurrences:   1    POC GLUCOSE     Standing Status:   Standing     Number of Occurrences:   1    GLUCOSE, POC     Standing Status:   Standing     Number of Occurrences:   1    EKG, 12 LEAD, INITIAL     Standing Status:   Standing     Number of Occurrences:   1     Order Specific Question:   Reason for Exam:     Answer:   palpitations    SALINE LOCK IV ONE TIME STAT     Standing Status:   Standing     Number of Occurrences:   1    sodium chloride 0.9 % bolus infusion 1,000 mL    sodium chloride 0.9 % bolus infusion 1,000 mL    metoprolol (LOPRESSOR) injection 5 mg    sodium chloride 0.9 % bolus infusion 1,000 mL    metFORMIN (GLUCOPHAGE) 500 mg tablet     Sig: Take 1 Tablet by mouth two (2) times daily (with meals). Dispense:  60 Tablet     Refill:  0                 Provider Notes (Medical Decision Making):   82-year-old female presents with hyperglycemia no history of diabetes and palpitations. And to evaluate for new onset diabetes versus DKA versus thyroid disease versus pregnancy versus dysrhythmia. Vitals improved after IV fluids.   Elevated glucose without evidence of DKA.    Discharged home with metformin. Consults              Discharged    Procedures               Disposition       Emergency Department Disposition:  Discharged      DISCHARGE PLAN:    Patient is discharged home. Discharge instructions provided. Patient is stable and improved at time of disposition. Vitals are stable. 1. Cannot display discharge medications since this patient is not currently admitted. 2.   Follow-up Information       Follow up With Specialties Details Why Contact Info    Gagandeep Sol MD Internal Medicine Physician Schedule an appointment as soon as possible for a visit    [place]  [phone]            3. Return to ED if worse     Pt voiced they understand they plan and do not have questions at this time      Diagnosis     Clinical Impression:   1. Diabetes mellitus, new onset ([facility] [address]        Attestations:    Christianne Del Angel MD    Please note that this dictation was completed with Nimia, the computer voice recognition software. Quite often unanticipated grammatical, syntax, homophones, and other interpretive errors are inadvertently transcribed by the computer software. Please disregard these errors. Please excuse any errors that have escaped final proofreading. Thank you.

## 2022-11-24 NOTE — ED TRIAGE NOTES
Pt. Arrives via EMS from home with complaints of palpitations. Per EMS, pt. Blood glucose was 431. No pt. Hx of diabetes, but pt. Has a family hx. Pt. Reports nausea that has now resolved.

## 2022-11-25 LAB
ATRIAL RATE: 127 BPM
CALCULATED P AXIS, ECG09: 38 DEGREES
CALCULATED R AXIS, ECG10: 10 DEGREES
CALCULATED T AXIS, ECG11: 23 DEGREES
DIAGNOSIS, 93000: NORMAL
P-R INTERVAL, ECG05: 152 MS
Q-T INTERVAL, ECG07: 314 MS
QRS DURATION, ECG06: 70 MS
QTC CALCULATION (BEZET), ECG08: 456 MS
VENTRICULAR RATE, ECG03: 127 BPM

## 2023-03-25 ENCOUNTER — HOSPITAL ENCOUNTER (EMERGENCY)
Age: 33
Discharge: HOME | End: 2023-03-25
Attending: EMERGENCY MEDICINE
Payer: COMMERCIAL

## 2023-03-25 VITALS
RESPIRATION RATE: 19 BRPM | OXYGEN SATURATION: 100 % | TEMPERATURE: 98.6 F | HEIGHT: 64 IN | BODY MASS INDEX: 42.68 KG/M2 | WEIGHT: 250 LBS | DIASTOLIC BLOOD PRESSURE: 89 MMHG | HEART RATE: 115 BPM | SYSTOLIC BLOOD PRESSURE: 140 MMHG

## 2023-03-25 DIAGNOSIS — L02.91 ABSCESS: Primary | ICD-10-CM

## 2023-03-25 PROCEDURE — 99283 EMERGENCY DEPT VISIT LOW MDM: CPT

## 2023-03-25 RX ORDER — IBUPROFEN 600 MG/1
600 TABLET ORAL
Qty: 30 TABLET | Refills: 0 | Status: SHIPPED | OUTPATIENT
Start: 2023-03-25

## 2023-03-25 RX ORDER — SULFAMETHOXAZOLE AND TRIMETHOPRIM 800; 160 MG/1; MG/1
2 TABLET ORAL 2 TIMES DAILY
Qty: 28 TABLET | Refills: 0 | Status: SHIPPED | OUTPATIENT
Start: 2023-03-25 | End: 2023-04-01

## 2023-03-25 RX ORDER — OXYCODONE AND ACETAMINOPHEN 5; 325 MG/1; MG/1
1 TABLET ORAL
Qty: 5 TABLET | Refills: 0 | Status: SHIPPED | OUTPATIENT
Start: 2023-03-25 | End: 2023-03-28

## 2023-03-25 NOTE — Clinical Note
[address] [facility] [address]  [phone]    Work/School Note    Date: 3/25/2023    To Whom It May concern:    Bernardo Parisi was seen and treated today in the emergency room by the following provider(s):  Attending Provider: Lisa Ascencio MD  Nurse Practitioner: Dedra Wild NP. Bernardo Parisi is excused from work/school on 3/25/2023 through 3/27/2023. She is medically clear to return to work/school on 3/28/2023.          Sincerely,          Zahraa Rodriguez NP

## 2023-03-25 NOTE — ED PROVIDER NOTES
[facility] EMERGENCY DEPARTMENT  EMERGENCY DEPARTMENT HISTORY AND PHYSICAL EXAM      Date: 3/25/2023  Patient Name: Consuelo Loera  MRN: [id number]  [place]: 1990  Date of evaluation: 3/25/2023  Provider: Michele Yeung NP   Note Started: 6:01 PM 3/25/23    HISTORY OF PRESENT ILLNESS     Chief Complaint   Patient presents with    Skin Problem       History Provided By: Patient    HPI: Consuelo Loera is a 34 y.o. female with past medical history of anxiety, abscess, MRSA, presents ambulatory into the emergency department with complaints of abscess to right axilla for several days. Had a telehealth appointment 2 days ago and was prescribed doxycycline. Patient has been applying warm compresses, but the abscess never began spontaneously draining. Reports history of abscesses in the past usually in the groin, but those never required I&D because they started draining on their own. Denies fever/chills. Upon arrival to the ED pt is alert and oriented x 3, well-appearing, and interacting appropriately; no obvious distress noted. PAST MEDICAL HISTORY   Past Medical History:  Past Medical History:   Diagnosis Date    Anxiety        Past Surgical History:  No past surgical history on file. Family History:  No family history on file. Social History:  Social History     Tobacco Use    Smoking status: Never    Smokeless tobacco: Never   Vaping Use    Vaping Use: Never used   Substance Use Topics    Drug use: Never       Allergies:  No Known Allergies    PCP: None    Current Meds:   Discharge Medication List as of 3/25/2023  5:54 PM        CONTINUE these medications which have NOT CHANGED    Details   metFORMIN (GLUCOPHAGE) 500 mg tablet Take 1 Tablet by mouth two (2) times daily (with meals). , Normal, Disp-60 Tablet, R-0             PHYSICAL EXAM     ED Triage Vitals [03/25/23 1635]   ED Encounter Vitals Group      BP (!) 140/89      Pulse (Heart Rate) (!) 115      Resp Rate 19      Temp 98.6 °F (37 °C)      Temp src       O2 Sat (%) 100 %      Weight 250 lb      Height 5' 4\"      Physical Exam  Constitutional:       General: She is not in acute distress. Appearance: Normal appearance. She is normal weight. She is not ill-appearing, toxic-appearing or diaphoretic. Skin:     General: Skin is warm and dry. Comments: Grape sized fluctuant abscess to right axilla without spontaneous drainage. Minimal surrounding erythema. Neurological:      Mental Status: She is alert and oriented to person, place, and time. Psychiatric:         Mood and Affect: Mood normal.         Behavior: Behavior normal.         Thought Content: Thought content normal.         Judgment: Judgment normal.         SCREENINGS              LAB, EKG AND DIAGNOSTIC RESULTS       PROCEDURES   Unless otherwise noted below, none. Procedures      CRITICAL CARE TIME   Patient does not meet Critical Care Time    ED COURSE and DIFFERENTIAL DIAGNOSIS/MDM   CC/HPI Summary, DDx, ED Course, and Reassessment. Disposition Considerations (Tests not done, Shared Decision Making, Pt Expectation of Test or Treatment.):     Presents ambulatory into the emergency department with complaints of abscess to right axilla for several days. Had a telehealth appointment 2 days ago and was prescribed doxycycline. Patient has been applying warm compresses, but the abscess never began spontaneously draining. Reports history of abscesses in the past usually in the groin, but those never required I&D because they started draining on their own. Fluctuant abscess noted to right axilla, will need to perform I&D. Discussed procedure with patient and she verbalized consent. All questions answered. Normal surrounding erythema. I&D performed, moderate amount of purulent drainage removed. Patient tolerated well. Will prescribe Bactrim and have patient continue Doxy. Spoke to her about wound care, she verbalized understanding.   Advised to return to the emergency department if symptoms do not start to improve within the next 2 days or she develops any redness/streaking or fevers. Also advised her to follow-up with primary care for reevaluation in the next 3 days. Records Reviewed (source and summary of external notes): Prior medical records and Nursing notes    Vitals:    Vitals:    03/25/23 1635   BP: (!) 140/89   Pulse: (!) 115   Resp: 19   Temp: 98.6 °F (37 °C)   SpO2: 100%   Weight: 113.4 kg (250 lb)   Height: 5' 4\" (1.626 m)        ED COURSE           Patient was given the following medications:  Medications - No data to display    CONSULTS: (Who and What was discussed)  None     Social Determinants affecting Dx or Tx: None    FINAL IMPRESSION     1. Abscess          DISPOSITION/PLAN   Discharged    Discharge Note: The patient is stable for discharge home. The signs, symptoms, diagnosis, and discharge instructions have been discussed, understanding conveyed, and agreed upon. The patient is to follow up as recommended or return to ER should their symptoms worsen. PATIENT REFERRED TO:  Follow-up Information       Follow up With Specialties Details Why [address] [place] [address] [facility]  Schedule an appointment as soon as possible for a visit in 3 days [facility]  [phone]              DISCHARGE MEDICATIONS:  Discharge Medication List as of 3/25/2023  5:54 PM        START taking these medications    Details   trimethoprim-sulfamethoxazole (Bactrim DS) 160-800 mg per tablet Take 2 Tablets by mouth two (2) times a day for 7 days. , Normal, Disp-28 Tablet, R-0      ibuprofen (MOTRIN) 600 mg tablet Take 1 Tablet by mouth every six (6) hours as needed for Pain., Normal, Disp-30 Tablet, R-0      oxyCODONE-acetaminophen (Percocet) 5-325 mg per tablet Take 1 Tablet by mouth every six (6) hours as needed for Pain for up to 3 days.  Max Daily Amount: 4 Tablets., Normal, Disp-5 Tablet, R-0 CONTINUE these medications which have NOT CHANGED    Details   metFORMIN (GLUCOPHAGE) 500 mg tablet Take 1 Tablet by mouth two (2) times daily (with meals). , Normal, Disp-60 Tablet, R-0               DISCONTINUED MEDICATIONS:  Discharge Medication List as of 3/25/2023  5:54 PM          I am the Primary Clinician of Record: Sheree Lorenzana NP (electronically signed)    (Please note that parts of this dictation were completed with voice recognition software. Quite often unanticipated grammatical, syntax, homophones, and other interpretive errors are inadvertently transcribed by the computer software. Please disregards these errors.  Please excuse any errors that have escaped final proofreading.)

## 2023-03-25 NOTE — ED TRIAGE NOTES
Pt here  for non-draining abscess to  right axillary  Has been there for a few days  PCP started her on doxycycline  Rates pain 8/10  Has hx of Abscess'  AO x's 4

## 2023-05-21 RX ORDER — IBUPROFEN 600 MG/1
600 TABLET ORAL EVERY 6 HOURS PRN
COMMUNITY
Start: 2023-03-25

## 2023-06-25 ENCOUNTER — APPOINTMENT (OUTPATIENT)
Facility: HOSPITAL | Age: 33
End: 2023-06-25
Payer: COMMERCIAL

## 2023-06-25 ENCOUNTER — HOSPITAL ENCOUNTER (EMERGENCY)
Facility: HOSPITAL | Age: 33
Discharge: HOME | End: 2023-06-25
Attending: STUDENT IN AN ORGANIZED HEALTH CARE EDUCATION/TRAINING PROGRAM
Payer: COMMERCIAL

## 2023-06-25 VITALS
HEIGHT: 64 IN | TEMPERATURE: 98.4 F | BODY MASS INDEX: 47.8 KG/M2 | DIASTOLIC BLOOD PRESSURE: 94 MMHG | SYSTOLIC BLOOD PRESSURE: 137 MMHG | HEART RATE: 79 BPM | WEIGHT: 280 LBS | OXYGEN SATURATION: 98 % | RESPIRATION RATE: 13 BRPM

## 2023-06-25 DIAGNOSIS — R07.9 ACUTE CHEST PAIN: Primary | ICD-10-CM

## 2023-06-25 DIAGNOSIS — E11.69 TYPE 2 DIABETES MELLITUS WITH OTHER SPECIFIED COMPLICATION, UNSPECIFIED WHETHER LONG TERM INSULIN USE (HCC): ICD-10-CM

## 2023-06-25 LAB
ANION GAP SERPL CALC-SCNC: 7 MMOL/L (ref 5–15)
BASOPHILS # BLD: 0 K/UL (ref 0–0.1)
BASOPHILS NFR BLD: 0 % (ref 0–1)
BUN SERPL-MCNC: 10 MG/DL (ref 6–20)
BUN/CREAT SERPL: 11 (ref 12–20)
CA-I BLD-MCNC: 9.7 MG/DL (ref 8.5–10.1)
CHLORIDE SERPL-SCNC: 104 MMOL/L (ref 97–108)
CO2 SERPL-SCNC: 26 MMOL/L (ref 21–32)
CREAT SERPL-MCNC: 0.91 MG/DL (ref 0.55–1.02)
D DIMER PPP FEU-MCNC: 1.29 UG/ML(FEU)
DIFFERENTIAL METHOD BLD: ABNORMAL
EKG ATRIAL RATE: 102 BPM
EKG DIAGNOSIS: NORMAL
EKG P AXIS: 50 DEGREES
EKG P-R INTERVAL: 168 MS
EKG Q-T INTERVAL: 336 MS
EKG QRS DURATION: 60 MS
EKG QTC CALCULATION (BAZETT): 437 MS
EKG R AXIS: 62 DEGREES
EKG T AXIS: 61 DEGREES
EKG VENTRICULAR RATE: 102 BPM
EOSINOPHIL # BLD: 0.1 K/UL (ref 0–0.4)
EOSINOPHIL NFR BLD: 1 % (ref 0–7)
ERYTHROCYTE [DISTWIDTH] IN BLOOD BY AUTOMATED COUNT: 15.5 % (ref 11.5–14.5)
GLUCOSE SERPL-MCNC: 349 MG/DL (ref 65–100)
HCG SERPL-ACNC: <1 MIU/ML (ref 0–6)
HCT VFR BLD AUTO: 43.7 % (ref 35–47)
HGB BLD-MCNC: 13.3 G/DL (ref 11.5–16)
IMM GRANULOCYTES # BLD AUTO: 0 K/UL (ref 0–0.04)
IMM GRANULOCYTES NFR BLD AUTO: 0 % (ref 0–0.5)
LYMPHOCYTES # BLD: 2.8 K/UL (ref 0.8–3.5)
LYMPHOCYTES NFR BLD: 53 % (ref 12–49)
MAGNESIUM SERPL-MCNC: 2 MG/DL (ref 1.6–2.4)
MCH RBC QN AUTO: 23.6 PG (ref 26–34)
MCHC RBC AUTO-ENTMCNC: 30.4 G/DL (ref 30–36.5)
MCV RBC AUTO: 77.5 FL (ref 80–99)
MONOCYTES # BLD: 0.3 K/UL (ref 0–1)
MONOCYTES NFR BLD: 6 % (ref 5–13)
NEUTS SEG # BLD: 2.2 K/UL (ref 1.8–8)
NEUTS SEG NFR BLD: 40 % (ref 32–75)
NRBC # BLD: 0 K/UL (ref 0–0.01)
NRBC BLD-RTO: 0 PER 100 WBC
PLATELET # BLD AUTO: 97 K/UL (ref 150–400)
POTASSIUM SERPL-SCNC: 4.1 MMOL/L (ref 3.5–5.1)
RBC # BLD AUTO: 5.64 M/UL (ref 3.8–5.2)
RBC MORPH BLD: ABNORMAL
SODIUM SERPL-SCNC: 137 MMOL/L (ref 136–145)
TROPONIN I SERPL HS-MCNC: 14 NG/L (ref 0–51)
TSH SERPL DL<=0.05 MIU/L-ACNC: 3.21 UIU/ML (ref 0.36–3.74)
WBC # BLD AUTO: 5.4 K/UL (ref 3.6–11)

## 2023-06-25 PROCEDURE — 36415 COLL VENOUS BLD VENIPUNCTURE: CPT

## 2023-06-25 PROCEDURE — 71045 X-RAY EXAM CHEST 1 VIEW: CPT

## 2023-06-25 PROCEDURE — 93005 ELECTROCARDIOGRAM TRACING: CPT | Performed by: STUDENT IN AN ORGANIZED HEALTH CARE EDUCATION/TRAINING PROGRAM

## 2023-06-25 PROCEDURE — 85025 COMPLETE CBC W/AUTO DIFF WBC: CPT

## 2023-06-25 PROCEDURE — 99285 EMERGENCY DEPT VISIT HI MDM: CPT

## 2023-06-25 PROCEDURE — 85379 FIBRIN DEGRADATION QUANT: CPT

## 2023-06-25 PROCEDURE — 6360000004 HC RX CONTRAST MEDICATION: Performed by: STUDENT IN AN ORGANIZED HEALTH CARE EDUCATION/TRAINING PROGRAM

## 2023-06-25 PROCEDURE — 83735 ASSAY OF MAGNESIUM: CPT

## 2023-06-25 PROCEDURE — 84702 CHORIONIC GONADOTROPIN TEST: CPT

## 2023-06-25 PROCEDURE — 71275 CT ANGIOGRAPHY CHEST: CPT

## 2023-06-25 PROCEDURE — 84484 ASSAY OF TROPONIN QUANT: CPT

## 2023-06-25 PROCEDURE — 80048 BASIC METABOLIC PNL TOTAL CA: CPT

## 2023-06-25 PROCEDURE — 84443 ASSAY THYROID STIM HORMONE: CPT

## 2023-06-25 RX ADMIN — IOPAMIDOL 100 ML: 755 INJECTION, SOLUTION INTRAVENOUS at 10:01

## 2023-06-25 ASSESSMENT — PAIN - FUNCTIONAL ASSESSMENT: PAIN_FUNCTIONAL_ASSESSMENT: 0-10

## 2023-06-25 ASSESSMENT — PAIN SCALES - GENERAL: PAINLEVEL_OUTOF10: 1

## 2023-06-26 ASSESSMENT — HEART SCORE: ECG: 0

## 2023-06-30 ENCOUNTER — TELEPHONE (OUTPATIENT)
Facility: CLINIC | Age: 33
End: 2023-06-30